# Patient Record
Sex: MALE | Race: WHITE | NOT HISPANIC OR LATINO | Employment: OTHER | URBAN - METROPOLITAN AREA
[De-identification: names, ages, dates, MRNs, and addresses within clinical notes are randomized per-mention and may not be internally consistent; named-entity substitution may affect disease eponyms.]

---

## 2017-02-17 ENCOUNTER — OFFICE VISIT (OUTPATIENT)
Dept: CARDIOLOGY | Facility: CLINIC | Age: 77
End: 2017-02-17

## 2017-02-17 VITALS
WEIGHT: 228 LBS | BODY MASS INDEX: 44.76 KG/M2 | HEART RATE: 61 BPM | HEIGHT: 60 IN | DIASTOLIC BLOOD PRESSURE: 70 MMHG | SYSTOLIC BLOOD PRESSURE: 130 MMHG

## 2017-02-17 DIAGNOSIS — E78.49 OTHER HYPERLIPIDEMIA: ICD-10-CM

## 2017-02-17 DIAGNOSIS — I25.810 CORONARY ARTERY DISEASE INVOLVING CORONARY BYPASS GRAFT OF NATIVE HEART WITHOUT ANGINA PECTORIS: Primary | ICD-10-CM

## 2017-02-17 DIAGNOSIS — I10 ESSENTIAL HYPERTENSION: ICD-10-CM

## 2017-02-17 DIAGNOSIS — I48.0 PAROXYSMAL ATRIAL FIBRILLATION (HCC): ICD-10-CM

## 2017-02-17 PROCEDURE — 99214 OFFICE O/P EST MOD 30 MIN: CPT | Performed by: INTERNAL MEDICINE

## 2017-02-17 PROCEDURE — 93000 ELECTROCARDIOGRAM COMPLETE: CPT | Performed by: INTERNAL MEDICINE

## 2017-02-17 NOTE — PROGRESS NOTES
Date of Office Visit: 17  Encounter Provider: Shan Rai MD  Place of Service: [facility] CARDIOLOGY  Patient Name: Robby Ken  :1940      Chief Complaint   Patient presents with   • Atrial Fibrillation   • Coronary Artery Disease     History of Present Illness  HPI Comments:  The patient is a very pleasant, 76-year-old gentleman with a history of coronary disease  with previous coronary artery bypass grafting.  He then presented with unstable angina and  acute infarct in 2013.  He went to [facility] and had cardiac  catheterization there that showed normal left ventricular systolic function.  The left  anterior descending was totally occluded but the left internal mammary graft to the left  anterior descending was patent.  The vein graft to the diagonal had severe disease.  The  circumflex was occluded.  The vein graft to the marginal however was patent.  The right  coronary artery was occluded, but the vein graft to the posterior descending artery was  patent.  His ischemia areas were just the distal circumflex and the diagonal vessel.  At  that time, it was felt that it was best to treat him medically, but he was also found to  be back in atrial fibrillation which was not treated.  He then went home from [facility] and presented back to [place], [place] with rapid atrial fibrillation and  unstable angina.  He did convert back to sinus rhythm and was transferred to [facility].  We reviewed his films and felt that it would still be better to treat that  medically but to treat his atrial fibrillation.  He was started on amiodarone.  But had recurrent atrial fibrillation so the amiodarone was discontinued.  He was also placed on warfarin.     He had some increased lower extremity edema and shortness of breath.  He was started on some Lasix and that improved.  He had an echocardiogram that showed normal left ventricular ejection  fraction only mild mitral insufficiency.   He comes in for follow-up.  He did see neurology about this balance issue no clear etiology was found.  He just [place] to move a little bit slower.  He was diagnosed with some cervical disc disease.  He still exercising regularly without any problems.  He was in the gym 3 days a week.  He's had no chest pain or pressure.  No real shortness breath, orthopnea, or PND.  No palpitations, near-syncope or syncope.  His amlodipine was discontinued.  Overall he's doing well.  His wife says things at home are good.    Atrial Fibrillation   Symptoms are negative for dizziness and weakness. Past medical history includes atrial fibrillation and CAD.   Coronary Artery Disease   Pertinent negatives include no dizziness, muscle weakness or weight gain. There is no history of past myocardial infarction.         Past Medical History   Diagnosis Date   • Atrial fibrillation    • Chronic coronary artery disease    • Hyperlipidemia    • Hypertension    • Myocardial infarction    • Prostate cancer    • Transient cerebral ischemia          Past Surgical History   Procedure Laterality Date   • Coronary artery bypass graft     • Cardiac catheterization  08/2013   • Prostatectomy       suprapubic   • Umbilical hernia repair           Current Outpatient Prescriptions on File Prior to Visit   Medication Sig Dispense Refill   • aspirin 81 MG tablet Take 1 tablet by mouth daily.     • b complex vitamins capsule Take 1 capsule by mouth daily.     • carvedilol (COREG) 6.25 MG tablet Take 6.25 mg by mouth. TAKE 2 TABLETS QAM, AND 1 TABLET QPM      • folic acid (FOLVITE) 400 MCG tablet Take 400 mcg by mouth Daily.     • furosemide (LASIX) 40 MG tablet Take 40 mg by mouth Daily As Needed.     • Glucosamine HCl (GLUCOSAMINE PO) Take 1 capsule by mouth daily.     • hydrALAZINE (APRESOLINE) 25 MG tablet Take 1 tablet by mouth 2 (two) times a day.     • Multiple Vitamin (MULTIVITAMIN) capsule Take 1 capsule by  mouth daily.     • omeprazole OTC (PriLOSEC OTC) 20 MG EC tablet Take 1 tablet by mouth daily.     • potassium chloride (KLOR-CON) 10 MEQ CR tablet Take 10 mEq by mouth Daily As Needed.     • pravastatin (PRAVACHOL) 40 MG tablet Take 1 tablet by mouth daily.     • WARFARIN SODIUM PO Take by mouth. TAKE 1 TABLET DAILY, IN ADDITION TO 1MG TABLETS EVERY OTHER DAY      • [DISCONTINUED] amLODIPine (NORVASC) 2.5 MG tablet Take 1 tablet by mouth daily.       No current facility-administered medications on file prior to visit.          Social History     Social History   • Marital status:      Spouse name: N/A   • Number of children: N/A   • Years of education: N/A     Occupational History   • Not on file.     Social History Main Topics   • Smoking status: Former Smoker   • Smokeless tobacco: Not on file   • Alcohol use No   • Drug use: No   • Sexual activity: Defer     Other Topics Concern   • Not on file     Social History Narrative       Family History   Problem Relation Age of Onset   • Breast cancer Sister    • Diabetes Sister    • Colon cancer Brother    • Prostate cancer Brother    • Coronary artery disease Other          Review of Systems   Constitution: Negative for decreased appetite, diaphoresis, fever, weakness, malaise/fatigue, weight gain and weight loss.   HENT: Negative for congestion, headaches, hearing loss, nosebleeds and tinnitus.    Eyes: Negative for blurred vision, double vision, vision loss in left eye, vision loss in right eye and visual disturbance.   Cardiovascular:        As noted in HPI   Respiratory:        As noted HPI   Endocrine: Negative for cold intolerance and heat intolerance.   Hematologic/Lymphatic: Negative for bleeding problem. Does not bruise/bleed easily.   Skin: Negative for color change, flushing, itching and rash.   Musculoskeletal: Negative for arthritis, back pain, joint pain, joint swelling, muscle weakness and myalgias.   Gastrointestinal: Negative for bloating,  "abdominal pain, constipation, diarrhea, dysphagia, heartburn, hematemesis, hematochezia, melena, nausea and vomiting.   Genitourinary: Negative for bladder incontinence, dysuria, frequency, nocturia and urgency.   Neurological: Positive for difficulty with concentration and numbness. Negative for dizziness, focal weakness, light-headedness, loss of balance, paresthesias and vertigo.   Psychiatric/Behavioral: Negative for depression, memory loss and substance abuse.       Procedures      ECG 12 Lead  Date/Time: 2/17/2017 12:35 PM  Performed by: NATHALIA WILDER  Authorized by: NATHALIA WILDER   Comparison: compared with previous ECG   Comparison to previous ECG: Back in afib                 Objective:      Visit Vitals   • /70 (BP Location: Right arm)   • Pulse 61   • Ht (!) 6\" (15.2 cm)   • Wt 228 lb (103 kg)   • BMI 4,452.82 kg/m2          Physical Exam  Physical Exam   Constitutional: He is oriented to person, place, and time. He appears well-developed and well-nourished. No distress.   HENT:   Head: Normocephalic.   Eyes: Conjunctivae are normal. Pupils are equal, round, and reactive to light. No scleral icterus.   Neck: Normal carotid pulses, no hepatojugular reflux and no JVD present. Carotid bruit is not present. No tracheal deviation, no edema and no erythema present. No thyromegaly present.   Cardiovascular: Normal rate, S1 normal, S2 normal, normal heart sounds and intact distal pulses.  An irregularly irregular rhythm present.  No extrasystoles are present. PMI is not displaced.  Exam reveals no gallop, no distant heart sounds and no friction rub.    No murmur heard.  Pulses:       Carotid pulses are 2+ on the right side, and 2+ on the left side.       Radial pulses are 2+ on the right side, and 2+ on the left side.        Femoral pulses are 2+ on the right side, and 2+ on the left side.       Dorsalis pedis pulses are 2+ on the right side, and 2+ on the left side.        Posterior tibial pulses " are 2+ on the right side, and 2+ on the left side.   Pulmonary/Chest: Effort normal and breath sounds normal. No respiratory distress. He has no decreased breath sounds. He has no wheezes. He has no rhonchi. He has no rales. He exhibits no tenderness.   Abdominal: Soft. Bowel sounds are normal. He exhibits no distension and no mass. There is no hepatosplenomegaly. There is no tenderness. There is no rebound and no guarding.   Musculoskeletal: He exhibits no edema, tenderness or deformity.   Neurological: He is alert and oriented to person, place, and time.   Skin: Skin is warm and dry. No rash noted. He is not diaphoretic. No cyanosis or erythema. No pallor. Nails show no clubbing.   Psychiatric: He has a normal mood and affect. His speech is normal and behavior is normal. Judgment and thought content normal.           Assessment:   1. This is a 76-year-old gentleman with a history of severe coronary disease status post coronary artery bypass grafting. Catheterization in 08/2013 showed stenosis in the vein  graft to the diagonal with the ischemic areas just being the diagonal vessel and the very distal circumflex.  Echocardiogram August 2016 normal left ventricular ejection fraction  Coronary Artery Disease  Assessment  • The patient has no angina    Plan  • Lifestyle modifications discussed include adhering to a heart healthy diet, avoidance of tobacco products, maintenance of a healthy weight, medication compliance, regular exercise and regular monitoring of cholesterol and blood pressure    Subjective - Objective  • There is a history of previous coronary artery bypass graft  • Current antiplatelet therapy includes aspirin 81 mg    He is now stable on medical therapy. He will continue the same and see Us in follow up in one year.     2. Paroxysmal atrial fibrillation.  Atrial Fibrillation and Atrial Flutter  Assessment  • The patient has paroxysmal atrial fibrillation  • This is non-valvular in etiology  • The  patient's CHADS2-VASc score is 4  • A [id number] score of 2 or more is considered a high risk for a thromboembolic event  • Warfarin prescribed    Plan  • Attempt to maintain sinus rhythm  • Continue warfarin for antithrombotic therapy, bleeding issues discussed  • Continue beta blocker for rhythm control  Still in and out of atrial fibrillation but asymptomatic continue the same.     3. Hyperlipidemia on pravastatin now.   4. History of hypertension. His blood pressure appears to be adequately controlled.   5. History of remote transient ischemic attack as above.   6. Gait disturbance .  Relatively benign neurologic evaluation.         Plan:

## 2018-02-20 ENCOUNTER — OFFICE VISIT (OUTPATIENT)
Dept: CARDIOLOGY | Facility: CLINIC | Age: 78
End: 2018-02-20

## 2018-02-20 VITALS
SYSTOLIC BLOOD PRESSURE: 150 MMHG | WEIGHT: 230 LBS | HEIGHT: 72 IN | DIASTOLIC BLOOD PRESSURE: 80 MMHG | HEART RATE: 87 BPM | BODY MASS INDEX: 31.15 KG/M2

## 2018-02-20 DIAGNOSIS — I10 ESSENTIAL HYPERTENSION: ICD-10-CM

## 2018-02-20 DIAGNOSIS — I48.20 CHRONIC ATRIAL FIBRILLATION (HCC): ICD-10-CM

## 2018-02-20 DIAGNOSIS — E78.2 MIXED HYPERLIPIDEMIA: ICD-10-CM

## 2018-02-20 DIAGNOSIS — I25.810 CORONARY ARTERY DISEASE INVOLVING CORONARY BYPASS GRAFT OF NATIVE HEART WITHOUT ANGINA PECTORIS: Primary | ICD-10-CM

## 2018-02-20 PROCEDURE — 99214 OFFICE O/P EST MOD 30 MIN: CPT | Performed by: INTERNAL MEDICINE

## 2018-02-20 PROCEDURE — 93000 ELECTROCARDIOGRAM COMPLETE: CPT | Performed by: INTERNAL MEDICINE

## 2018-02-20 NOTE — PROGRESS NOTES
Date of Office Visit: 18  Encounter Provider: Shan Rai MD  Place of Service: [facility] CARDIOLOGY  Patient Name: Robby Ken  :1940      Chief Complaint   Patient presents with   • Coronary Artery Disease   • Atrial Fibrillation     History of Present Illness  HPI Comments:  The patient is a very pleasant, 77-year-old gentleman with a history of coronary disease  with previous coronary artery bypass grafting.  He then presented with unstable angina and  acute infarct in 2013.  He went to [facility] and had cardiac  catheterization there that showed normal left ventricular systolic function.  The left  anterior descending was totally occluded but the left internal mammary graft to the left  anterior descending was patent.  The vein graft to the diagonal had severe disease.  The  circumflex was occluded.  The vein graft to the marginal however was patent.  The right  coronary artery was occluded, but the vein graft to the posterior descending artery was  patent.  His ischemia areas were just the distal circumflex and the diagonal vessel.  At  that time, it was felt that it was best to treat him medically, but he was also found to  be back in atrial fibrillation which was not treated.  He then went home from [facility] and presented back to [place], [place] with rapid atrial fibrillation and  unstable angina.  He did convert back to sinus rhythm and was transferred to [facility].  We reviewed his films and felt that it would still be better to treat that  medically but to treat his atrial fibrillation.  He was started on amiodarone.  But had recurrent atrial fibrillation so the amiodarone was discontinued.  He was also placed on warfarin.     He had some increased lower extremity edema and shortness of breath.  He was started on some Lasix and that improved.  He had an echocardiogram that showed normal left ventricular ejection  fraction only mild mitral insufficiency.   He comes in for follow-up.  The patient denies chest pain, pressure and heaviness. No shortness of breath, othopnea or PND. No palpitations, near syncope or syncope. No stroke type symptoms like paralysis, paresthesia, abrupt vision loss and dysarthria. No bleeding like blood in the stool or dark stools.   During I am he did fall and broke his right wrist and hasn't in a cast.  Prior to that he been exercising regularly he is getting get back to that probably starting tomorrow he has a couple more weeks to wear the cast.  Overall he's doing well things at home are good.    Coronary Artery Disease   Pertinent negatives include no dizziness, muscle weakness or weight gain. There is no history of past myocardial infarction.   Atrial Fibrillation   Symptoms are negative for dizziness and weakness. Past medical history includes atrial fibrillation and CAD.         Past Medical History:   Diagnosis Date   • Atrial fibrillation    • Chronic coronary artery disease    • Hyperlipidemia    • Hypertension    • Myocardial infarction    • Prostate cancer    • Transient cerebral ischemia          Past Surgical History:   Procedure Laterality Date   • CARDIAC CATHETERIZATION  08/2013   • CORONARY ARTERY BYPASS GRAFT     • PROSTATECTOMY      suprapubic   • UMBILICAL HERNIA REPAIR           Current Outpatient Prescriptions on File Prior to Visit   Medication Sig Dispense Refill   • aspirin 81 MG tablet Take 1 tablet by mouth daily.     • b complex vitamins capsule Take 1 capsule by mouth daily.     • carvedilol (COREG) 6.25 MG tablet Take 6.25 mg by mouth. TAKE 2 TABLETS QAM, AND 1 TABLET QPM      • folic acid (FOLVITE) 400 MCG tablet Take 400 mcg by mouth Daily.     • furosemide (LASIX) 40 MG tablet Take 40 mg by mouth Daily As Needed.     • Glucosamine HCl (GLUCOSAMINE PO) Take 1 capsule by mouth daily.     • hydrALAZINE (APRESOLINE) 25 MG tablet Take 1 tablet by mouth 2 (two) times a  day.     • Multiple Vitamin (MULTIVITAMIN) capsule Take 1 capsule by mouth daily.     • omeprazole OTC (PriLOSEC OTC) 20 MG EC tablet Take 1 tablet by mouth daily.     • potassium chloride (KLOR-CON) 10 MEQ CR tablet Take 10 mEq by mouth Daily As Needed.     • pravastatin (PRAVACHOL) 40 MG tablet Take 1 tablet by mouth daily.     • WARFARIN SODIUM PO Take by mouth. TAKE 1 TABLET DAILY, IN ADDITION TO 1MG TABLETS EVERY OTHER DAY        No current facility-administered medications on file prior to visit.          Social History     Social History   • Marital status:      Spouse name: N/A   • Number of children: N/A   • Years of education: N/A     Occupational History   • Not on file.     Social History Main Topics   • Smoking status: Former Smoker   • Smokeless tobacco: Not on file   • Alcohol use No   • Drug use: No   • Sexual activity: Defer     Other Topics Concern   • Not on file     Social History Narrative       Family History   Problem Relation Age of Onset   • Breast cancer Sister    • Diabetes Sister    • Colon cancer Brother    • Prostate cancer Brother    • Coronary artery disease Other          Review of Systems   Constitution: Negative for decreased appetite, diaphoresis, fever, weakness, malaise/fatigue, weight gain and weight loss.   HENT: Negative for congestion, hearing loss, nosebleeds and tinnitus.    Eyes: Negative for blurred vision, double vision, vision loss in left eye, vision loss in right eye and visual disturbance.   Cardiovascular:        As noted in HPI   Respiratory:        As noted HPI   Endocrine: Negative for cold intolerance and heat intolerance.   Hematologic/Lymphatic: Negative for bleeding problem. Does not bruise/bleed easily.   Skin: Negative for color change, flushing, itching and rash.   Musculoskeletal: Negative for arthritis, back pain, joint pain, joint swelling, muscle weakness and myalgias.   Gastrointestinal: Negative for bloating, abdominal pain, constipation,  "diarrhea, dysphagia, heartburn, hematemesis, hematochezia, melena, nausea and vomiting.   Genitourinary: Negative for bladder incontinence, dysuria, frequency, nocturia and urgency.   Neurological: Negative for dizziness, focal weakness, headaches, light-headedness, loss of balance, numbness, paresthesias and vertigo.   Psychiatric/Behavioral: Negative for depression, memory loss and substance abuse.       Procedures      ECG 12 Lead  Date/Time: 2/20/2018 1:30 PM  Performed by: NATHALIA WILDER  Authorized by: NATHALIA WILDER   Comparison: compared with previous ECG   Similar to previous ECG  Rhythm: atrial fibrillation  Rate: normal  QRS axis: normal                 Objective:    /80 (BP Location: Left arm)  Pulse 87  Ht 182.9 cm (72\")  Wt 104 kg (230 lb)  BMI 31.19 kg/m2       Physical Exam  Physical Exam   Constitutional: He is oriented to person, place, and time. He appears well-developed and well-nourished. No distress.   HENT:   Head: Normocephalic.   Eyes: Conjunctivae are normal. Pupils are equal, round, and reactive to light. No scleral icterus.   Neck: Normal carotid pulses, no hepatojugular reflux and no JVD present. Carotid bruit is not present. No tracheal deviation, no edema and no erythema present. No thyromegaly present.   Cardiovascular: Normal rate, S1 normal, S2 normal, normal heart sounds and intact distal pulses.  An irregularly irregular rhythm present.  No extrasystoles are present. PMI is not displaced.  Exam reveals no gallop, no distant heart sounds and no friction rub.    No murmur heard.  Pulses:       Carotid pulses are 2+ on the right side, and 2+ on the left side.       Radial pulses are 2+ on the right side, and 2+ on the left side.        Femoral pulses are 2+ on the right side, and 2+ on the left side.       Dorsalis pedis pulses are 2+ on the right side, and 2+ on the left side.        Posterior tibial pulses are 2+ on the right side, and 2+ on the left side. "   Pulmonary/Chest: Effort normal and breath sounds normal. No respiratory distress. He has no decreased breath sounds. He has no wheezes. He has no rhonchi. He has no rales. He exhibits no tenderness.   Abdominal: Soft. Bowel sounds are normal. He exhibits no distension and no mass. There is no hepatosplenomegaly. There is no tenderness. There is no rebound and no guarding.   Musculoskeletal: He exhibits no edema, tenderness or deformity.   Right forearm cast   Neurological: He is alert and oriented to person, place, and time.   Skin: Skin is warm and dry. No rash noted. He is not diaphoretic. No cyanosis or erythema. No pallor. Nails show no clubbing.   Psychiatric: He has a normal mood and affect. His speech is normal and behavior is normal. Judgment and thought content normal.           Assessment:   1. This is a 77-year-old gentleman with a history of severe coronary disease status post coronary artery bypass grafting. Catheterization in 08/2013 showed stenosis in the vein  graft to the diagonal with the ischemic areas just being the diagonal vessel and the very distal circumflex.  Echocardiogram August 2016 normal left ventricular ejection fraction  Coronary Artery Disease  Assessment  • The patient has no angina    Plan  • Lifestyle modifications discussed include adhering to a heart healthy diet, avoidance of tobacco products, maintenance of a healthy weight, medication compliance, regular exercise and regular monitoring of cholesterol and blood pressure    Subjective - Objective  • There is a history of previous coronary artery bypass graft  • Current antiplatelet therapy includes aspirin 81 mg    He is now stable on medical therapy. He will continue the same and see Us in follow up in one year.      2. Atrial fibrillation.  Atrial Fibrillation and Atrial Flutter  Assessment  • The patient has permanent atrial fibrillation  • This is non-valvular in etiology  • The patient's CHADS2-VASc score is 4  • A  [id number] score of 2 or more is considered a high risk for a thromboembolic event  • Warfarin prescribed    Plan  • Attempt to maintain sinus rhythm  • Continue warfarin for antithrombotic therapy, bleeding issues discussed  • Continue beta blocker for rhythm control  Asymptomatic continue the same.     3. Hyperlipidemia on pravastatin now.  If his LDL is not at goal of 70 or less consider switching to atorvastatin. Received his blood work his last LDL was 90 in April 2017. Spoke with wife we will switch to atorvastatin 40 mg a day and they will call if problems.  4. History of hypertension. His blood pressure appears to be adequately controlled.   5. History of remote transient ischemic attack as above.    6.  Recent fall on the ice with fractured right wrist in a cast but recovering.         Plan:

## 2018-02-21 RX ORDER — ATORVASTATIN CALCIUM 40 MG/1
40 TABLET, FILM COATED ORAL DAILY
Qty: 90 TABLET | Refills: 3 | Status: SHIPPED | OUTPATIENT
Start: 2018-02-21 | End: 2019-01-15 | Stop reason: SDUPTHER

## 2019-01-16 RX ORDER — ATORVASTATIN CALCIUM 40 MG/1
TABLET, FILM COATED ORAL
Qty: 90 TABLET | Refills: 1 | Status: SHIPPED | OUTPATIENT
Start: 2019-01-16 | End: 2019-08-26 | Stop reason: SDUPTHER

## 2019-06-03 ENCOUNTER — HOSPITAL ENCOUNTER (OUTPATIENT)
Dept: CARDIOLOGY | Facility: HOSPITAL | Age: 79
Discharge: HOME | End: 2019-06-03
Admitting: INTERNAL MEDICINE

## 2019-06-03 ENCOUNTER — OFFICE VISIT (OUTPATIENT)
Dept: CARDIOLOGY | Facility: CLINIC | Age: 79
End: 2019-06-03

## 2019-06-03 VITALS
HEIGHT: 72 IN | DIASTOLIC BLOOD PRESSURE: 80 MMHG | BODY MASS INDEX: 33.18 KG/M2 | WEIGHT: 245 LBS | SYSTOLIC BLOOD PRESSURE: 124 MMHG

## 2019-06-03 DIAGNOSIS — I48.20 CHRONIC ATRIAL FIBRILLATION (HCC): ICD-10-CM

## 2019-06-03 DIAGNOSIS — I10 ESSENTIAL HYPERTENSION: ICD-10-CM

## 2019-06-03 DIAGNOSIS — I25.810 CORONARY ARTERY DISEASE INVOLVING CORONARY BYPASS GRAFT OF NATIVE HEART WITHOUT ANGINA PECTORIS: Primary | ICD-10-CM

## 2019-06-03 DIAGNOSIS — E78.2 MIXED HYPERLIPIDEMIA: ICD-10-CM

## 2019-06-03 DIAGNOSIS — R60.0 LOWER EXTREMITY EDEMA: ICD-10-CM

## 2019-06-03 LAB
AORTIC ROOT ANNULUS: 2.4 CM
ASCENDING AORTA: 3.1 CM
BH CV ECHO MEAS - ACS: 2.1 CM
BH CV ECHO MEAS - AO MAX PG (FULL): 3.8 MMHG
BH CV ECHO MEAS - AO MAX PG: 5.2 MMHG
BH CV ECHO MEAS - AO MEAN PG (FULL): 1.5 MMHG
BH CV ECHO MEAS - AO MEAN PG: 2.3 MMHG
BH CV ECHO MEAS - AO V2 MAX: 114 CM/SEC
BH CV ECHO MEAS - AO V2 MEAN: 70.2 CM/SEC
BH CV ECHO MEAS - AO V2 VTI: 19.3 CM
BH CV ECHO MEAS - AVA(I,A): 1.8 CM^2
BH CV ECHO MEAS - AVA(I,D): 1.8 CM^2
BH CV ECHO MEAS - AVA(V,A): 1.7 CM^2
BH CV ECHO MEAS - AVA(V,D): 1.7 CM^2
BH CV ECHO MEAS - BSA(HAYCOCK): 2.4 M^2
BH CV ECHO MEAS - BSA: 2.3 M^2
BH CV ECHO MEAS - BZI_BMI: 33.2 KILOGRAMS/M^2
BH CV ECHO MEAS - BZI_METRIC_HEIGHT: 182.9 CM
BH CV ECHO MEAS - BZI_METRIC_WEIGHT: 111.1 KG
BH CV ECHO MEAS - EDV(MOD-SP2): 72 ML
BH CV ECHO MEAS - EDV(MOD-SP4): 71 ML
BH CV ECHO MEAS - EDV(TEICH): 91 ML
BH CV ECHO MEAS - EF(CUBED): 59.6 %
BH CV ECHO MEAS - EF(MOD-BP): 53 %
BH CV ECHO MEAS - EF(MOD-SP2): 54.2 %
BH CV ECHO MEAS - EF(MOD-SP4): 49.3 %
BH CV ECHO MEAS - EF(TEICH): 51.4 %
BH CV ECHO MEAS - ESV(MOD-SP2): 33 ML
BH CV ECHO MEAS - ESV(MOD-SP4): 36 ML
BH CV ECHO MEAS - ESV(TEICH): 44.2 ML
BH CV ECHO MEAS - FS: 26.1 %
BH CV ECHO MEAS - IVS/LVPW: 1
BH CV ECHO MEAS - IVSD: 1.3 CM
BH CV ECHO MEAS - LAT PEAK E' VEL: 14 CM/SEC
BH CV ECHO MEAS - LV DIASTOLIC VOL/BSA (35-75): 30.6 ML/M^2
BH CV ECHO MEAS - LV MASS(C)D: 211.2 GRAMS
BH CV ECHO MEAS - LV MASS(C)DI: 90.9 GRAMS/M^2
BH CV ECHO MEAS - LV MAX PG: 1.4 MMHG
BH CV ECHO MEAS - LV MEAN PG: 0.79 MMHG
BH CV ECHO MEAS - LV SYSTOLIC VOL/BSA (12-30): 15.5 ML/M^2
BH CV ECHO MEAS - LV V1 MAX: 60.1 CM/SEC
BH CV ECHO MEAS - LV V1 MEAN: 40.5 CM/SEC
BH CV ECHO MEAS - LV V1 VTI: 10.4 CM
BH CV ECHO MEAS - LVIDD: 4.5 CM
BH CV ECHO MEAS - LVIDS: 3.3 CM
BH CV ECHO MEAS - LVLD AP2: 7.3 CM
BH CV ECHO MEAS - LVLD AP4: 7.1 CM
BH CV ECHO MEAS - LVLS AP2: 5.8 CM
BH CV ECHO MEAS - LVLS AP4: 6.2 CM
BH CV ECHO MEAS - LVOT AREA (M): 3.1 CM^2
BH CV ECHO MEAS - LVOT AREA: 3.3 CM^2
BH CV ECHO MEAS - LVOT DIAM: 2 CM
BH CV ECHO MEAS - LVPWD: 1.3 CM
BH CV ECHO MEAS - MED PEAK E' VEL: 9 CM/SEC
BH CV ECHO MEAS - MR MAX PG: 21 MMHG
BH CV ECHO MEAS - MR MAX VEL: 229.4 CM/SEC
BH CV ECHO MEAS - MV DEC SLOPE: 398 CM/SEC^2
BH CV ECHO MEAS - MV DEC TIME: 0.16 SEC
BH CV ECHO MEAS - MV E MAX VEL: 75.2 CM/SEC
BH CV ECHO MEAS - MV MAX PG: 3 MMHG
BH CV ECHO MEAS - MV MEAN PG: 1 MMHG
BH CV ECHO MEAS - MV P1/2T MAX VEL: 75.7 CM/SEC
BH CV ECHO MEAS - MV P1/2T: 55.7 MSEC
BH CV ECHO MEAS - MV V2 MAX: 86.9 CM/SEC
BH CV ECHO MEAS - MV V2 MEAN: 47.5 CM/SEC
BH CV ECHO MEAS - MV V2 VTI: 21.1 CM
BH CV ECHO MEAS - MVA P1/2T LCG: 2.9 CM^2
BH CV ECHO MEAS - MVA(P1/2T): 4 CM^2
BH CV ECHO MEAS - MVA(VTI): 1.6 CM^2
BH CV ECHO MEAS - PA ACC TIME: 0.1 SEC
BH CV ECHO MEAS - PA MAX PG (FULL): 3.6 MMHG
BH CV ECHO MEAS - PA MAX PG: 4.7 MMHG
BH CV ECHO MEAS - PA PR(ACCEL): 36.2 MMHG
BH CV ECHO MEAS - PA V2 MAX: 108.6 CM/SEC
BH CV ECHO MEAS - PVA(V,A): 1.6 CM^2
BH CV ECHO MEAS - PVA(V,D): 1.6 CM^2
BH CV ECHO MEAS - QP/QS: 0.77
BH CV ECHO MEAS - RV MAX PG: 1.1 MMHG
BH CV ECHO MEAS - RV MEAN PG: 0.61 MMHG
BH CV ECHO MEAS - RV V1 MAX: 52.9 CM/SEC
BH CV ECHO MEAS - RV V1 MEAN: 36.6 CM/SEC
BH CV ECHO MEAS - RV V1 VTI: 8 CM
BH CV ECHO MEAS - RVOT AREA: 3.3 CM^2
BH CV ECHO MEAS - RVOT DIAM: 2 CM
BH CV ECHO MEAS - SI(CUBED): 22.9 ML/M^2
BH CV ECHO MEAS - SI(LVOT): 14.6 ML/M^2
BH CV ECHO MEAS - SI(MOD-SP2): 16.8 ML/M^2
BH CV ECHO MEAS - SI(MOD-SP4): 15.1 ML/M^2
BH CV ECHO MEAS - SI(TEICH): 20.1 ML/M^2
BH CV ECHO MEAS - SV(CUBED): 53.2 ML
BH CV ECHO MEAS - SV(LVOT): 33.9 ML
BH CV ECHO MEAS - SV(MOD-SP2): 39 ML
BH CV ECHO MEAS - SV(MOD-SP4): 35 ML
BH CV ECHO MEAS - SV(RVOT): 26 ML
BH CV ECHO MEAS - SV(TEICH): 46.7 ML
BH CV ECHO MEAS - TAPSE (>1.6): 1.9 CM2
BH CV ECHO MEAS - TR MAX VEL: 224 CM/SEC
BH CV ECHO MEASUREMENTS AVERAGE E/E' RATIO: 6.54
BH CV XLRA - RV BASE: 2.6 CM
BH CV XLRA - TDI S': 11 CM/SEC
LEFT ATRIUM VOLUME INDEX: 30 ML/M2
MAXIMAL PREDICTED HEART RATE: 143 BPM
SINUS: 3.2 CM
STJ: 2.7 CM
STRESS TARGET HR: 122 BPM

## 2019-06-03 PROCEDURE — 93306 TTE W/DOPPLER COMPLETE: CPT | Performed by: INTERNAL MEDICINE

## 2019-06-03 PROCEDURE — 93000 ELECTROCARDIOGRAM COMPLETE: CPT | Performed by: INTERNAL MEDICINE

## 2019-06-03 PROCEDURE — 93306 TTE W/DOPPLER COMPLETE: CPT

## 2019-06-03 PROCEDURE — 25010000002 PERFLUTREN (DEFINITY) 8.476 MG IN SODIUM CHLORIDE 0.9 % 10 ML INJECTION: Performed by: INTERNAL MEDICINE

## 2019-06-03 PROCEDURE — 99214 OFFICE O/P EST MOD 30 MIN: CPT | Performed by: INTERNAL MEDICINE

## 2019-06-03 RX ORDER — WARFARIN SODIUM 5 MG/1
5 TABLET ORAL
COMMUNITY

## 2019-06-03 RX ADMIN — PERFLUTREN 1.5 ML: 6.52 INJECTION, SUSPENSION INTRAVENOUS at 13:49

## 2019-06-03 NOTE — PROGRESS NOTES
Date of Office Visit: 19  Encounter Provider: Shan Rai MD  Place of Service: [facility]  Patient Name: Robby Ken  :1940  Referral Provider:Shan Rai MD      No chief complaint on file.    History of Present Illness   The patient is a very pleasant, 78-year-old gentleman with a history of coronary disease  with previous coronary artery bypass grafting.  He then presented with unstable angina and  acute infarct in 2013.  He went to [facility] and had cardiac  catheterization there that showed normal left ventricular systolic function.  The left  anterior descending was totally occluded but the left internal mammary graft to the left  anterior descending was patent.  The vein graft to the diagonal had severe disease.  The  circumflex was occluded.  The vein graft to the marginal however was patent.  The right  coronary artery was occluded, but the vein graft to the posterior descending artery was  patent.  His ischemia areas were just the distal circumflex and the diagonal vessel.  At  that time, it was felt that it was best to treat him medically, but he was also found to  be back in atrial fibrillation which was not treated.  He then went home from [facility] and presented back to [place], [place] with rapid atrial fibrillation and  unstable angina.  He did convert back to sinus rhythm and was transferred to [facility].  We reviewed his films and felt that it would still be better to treat that  medically but to treat his atrial fibrillation.  He was started on amiodarone.  But had recurrent atrial fibrillation so the amiodarone was discontinued.  He was also placed on warfarin.     He had some increased lower extremity edema and shortness of breath.  He was started on some Lasix and that improved.  He had an echocardiogram that showed normal left ventricular ejection fraction only mild mitral insufficiency.    He comes in for follow-up.  Coming in now about March 2019 he started having some shortness of breath increased lower extremity edema but he is only taking his Lasix as needed he was seen in [place] start taking the diuretic daily improved and he went back to just as needed and he got progressively more short of breath and increased lower extremity edema so PCP again this time restart the Lasix and increase at 80 mg a day and he is getting some better.  Gained weight.  He had also been falling and did fall and hurt his back.  He said no chest pain or pressure.  No palpitations, near syncope or syncope. No stroke type symptoms like paralysis, paresthesia, abrupt vision loss and dysarthria. No bleeding like blood in the stool or dark stools.       Coronary Artery Disease   Symptoms include weight gain. Pertinent negatives include no dizziness or muscle weakness. There is no history of past myocardial infarction.   Atrial Fibrillation   Symptoms are negative for dizziness and weakness. Past medical history includes atrial fibrillation and CAD.         Past Medical History:   Diagnosis Date   • Atrial fibrillation (CMS/HCC)    • Chronic coronary artery disease    • Hyperlipidemia    • Hypertension    • Myocardial infarction (CMS/HCC)    • Prostate cancer (CMS/HCC)    • Transient cerebral ischemia          Past Surgical History:   Procedure Laterality Date   • CARDIAC CATHETERIZATION  08/2013   • CORONARY ARTERY BYPASS GRAFT     • PROSTATECTOMY      suprapubic   • UMBILICAL HERNIA REPAIR           Current Outpatient Medications on File Prior to Visit   Medication Sig Dispense Refill   • aspirin 81 MG tablet Take 1 tablet by mouth daily.     • atorvastatin (LIPITOR) 40 MG tablet TAKE 1 TABLET EVERY DAY 90 tablet 1   • b complex vitamins capsule Take 1 capsule by mouth daily.     • carvedilol (COREG) 6.25 MG tablet Take 6.25 mg by mouth. TAKE 2 TABLETS QAM, AND 1 TABLET QPM      • folic acid (FOLVITE) 400 MCG tablet Take  400 mcg by mouth Daily.     • furosemide (LASIX) 40 MG tablet Take 40 mg by mouth Daily As Needed.     • Glucosamine HCl (GLUCOSAMINE PO) Take 1 capsule by mouth daily.     • hydrALAZINE (APRESOLINE) 25 MG tablet Take 1 tablet by mouth 2 (two) times a day.     • Multiple Vitamin (MULTIVITAMIN) capsule Take 1 capsule by mouth daily.     • omeprazole OTC (PriLOSEC OTC) 20 MG EC tablet Take 1 tablet by mouth daily.     • potassium chloride (KLOR-CON) 10 MEQ CR tablet Take 10 mEq by mouth Daily As Needed.     • WARFARIN SODIUM PO Take by mouth. TAKE 1 TABLET DAILY, IN ADDITION TO 1MG TABLETS EVERY OTHER DAY        No current facility-administered medications on file prior to visit.          Social History     Socioeconomic History   • Marital status:      Spouse name: Not on file   • Number of children: Not on file   • Years of education: Not on file   • Highest education level: Not on file   Tobacco Use   • Smoking status: Former Smoker   Substance and Sexual Activity   • Alcohol use: No   • Drug use: No   • Sexual activity: Defer       Family History   Problem Relation Age of Onset   • Breast cancer Sister    • Diabetes Sister    • Colon cancer Brother    • Prostate cancer Brother    • Coronary artery disease Other          Review of Systems   Constitution: Positive for malaise/fatigue and weight gain. Negative for decreased appetite, diaphoresis, fever, weakness and weight loss.   HENT: Negative for congestion, hearing loss, nosebleeds and tinnitus.    Eyes: Negative for blurred vision, double vision, vision loss in left eye, vision loss in right eye and visual disturbance.   Cardiovascular:        As noted in HPI   Respiratory:        As noted HPI   Endocrine: Negative for cold intolerance and heat intolerance.   Hematologic/Lymphatic: Negative for bleeding problem. Does not bruise/bleed easily.   Skin: Negative for color change, flushing, itching and rash.   Musculoskeletal: Negative for arthritis, back  pain, joint pain, joint swelling, muscle weakness and myalgias.   Gastrointestinal: Negative for bloating, abdominal pain, constipation, diarrhea, dysphagia, heartburn, hematemesis, hematochezia, melena, nausea and vomiting.   Genitourinary: Negative for bladder incontinence, dysuria, frequency, nocturia and urgency.   Neurological: Negative for dizziness, focal weakness, headaches, light-headedness, loss of balance, numbness, paresthesias and vertigo.   Psychiatric/Behavioral: Negative for depression, memory loss and substance abuse.       Procedures      ECG 12 Lead  Date/Time: 6/3/2019 1:00 PM  Performed by: Shan Rai MD  Authorized by: Shan Rai MD   Comparison: compared with previous ECG   Similar to previous ECG  Rhythm: atrial fibrillation  Rate: normal  QRS axis: normal                  Objective:    There were no vitals taken for this visit.       Physical Exam  Physical Exam   Constitutional: He is oriented to person, place, and time. He appears well-developed and well-nourished. No distress.   HENT:   Head: Normocephalic.   Eyes: Conjunctivae are normal. Pupils are equal, round, and reactive to light. No scleral icterus.   Neck: Normal carotid pulses, no hepatojugular reflux and no JVD present. Carotid bruit is not present. No tracheal deviation, no edema and no erythema present. No thyromegaly present.   Cardiovascular: Normal rate, S1 normal, S2 normal, normal heart sounds and intact distal pulses. An irregularly irregular rhythm present.  No extrasystoles are present. PMI is not displaced. Exam reveals no gallop, no distant heart sounds and no friction rub.   No murmur heard.  Pulses:       Carotid pulses are 2+ on the right side, and 2+ on the left side.       Radial pulses are 2+ on the right side, and 2+ on the left side.        Femoral pulses are 2+ on the right side, and 2+ on the left side.       Dorsalis pedis pulses are 2+ on the right side, and 2+ on the left side.         Posterior tibial pulses are 2+ on the right side, and 2+ on the left side.   Pulmonary/Chest: Effort normal and breath sounds normal. No respiratory distress. He has no decreased breath sounds. He has no wheezes. He has no rhonchi. He has no rales. He exhibits no tenderness.   Abdominal: Soft. Bowel sounds are normal. He exhibits no distension and no mass. There is no hepatosplenomegaly. There is no tenderness. There is no rebound and no guarding.   Musculoskeletal: He exhibits edema (1+ right tibial). He exhibits no tenderness or deformity.   Neurological: He is alert and oriented to person, place, and time.   Skin: Skin is warm and dry. No rash noted. He is not diaphoretic. No cyanosis or erythema. No pallor. Nails show no clubbing.   Psychiatric: He has a normal mood and affect. His speech is normal and behavior is normal. Judgment and thought content normal.           Assessment:   1. This is a 78-year-old gentleman with a history of severe coronary disease status post coronary artery bypass grafting. Catheterization in 08/2013 showed stenosis in the vein  graft to the diagonal with the ischemic areas just being the diagonal vessel and the very distal circumflex.  Echocardiogram August 2016 normal left ventricular ejection fraction  Coronary Artery Disease  Assessment  • The patient has no angina    Plan  • Lifestyle modifications discussed include adhering to a heart healthy diet, avoidance of tobacco products, maintenance of a healthy weight, medication compliance, regular exercise and regular monitoring of cholesterol and blood pressure    Subjective - Objective  • There is a history of previous coronary artery bypass graft  • Current antiplatelet therapy includes aspirin 81 mg          2. Atrial fibrillation.  Atrial Fibrillation and Atrial Flutter  Assessment  • The patient has permanent atrial fibrillation  • This is non-valvular in etiology  • The patient's CHADS2-VASc score is 4  • A [id number] score  of 2 or more is considered a high risk for a thromboembolic event  • Warfarin prescribed    Plan  • Attempt to maintain sinus rhythm  • Continue warfarin for antithrombotic therapy, bleeding issues discussed  • Continue beta blocker for rhythm control       3. Hyperlipidemia on atorvastatin target dose.  4. History of hypertension. His blood pressure appears to be adequately controlled.   5. History of remote transient ischemic attack as above.    6.  Lower extremity edema and shortness of breath.  Certainly could have some underlying heart failure.  He is to have a echocardiogram today if his LV systolic function is normal and no significant valvular disease would have him just take the Lasix 40 mg a day and keep his regular appointment with us.  If his LV function is obviously worse we will need to work-up his cardiac status further.  7.  Fall and back injury.  Is has limited some.         Plan:

## 2019-08-27 RX ORDER — ATORVASTATIN CALCIUM 40 MG/1
TABLET, FILM COATED ORAL
Qty: 90 TABLET | Refills: 0 | Status: SHIPPED | OUTPATIENT
Start: 2019-08-27 | End: 2019-11-25 | Stop reason: SDUPTHER

## 2019-11-26 RX ORDER — ATORVASTATIN CALCIUM 40 MG/1
TABLET, FILM COATED ORAL
Qty: 90 TABLET | Refills: 0 | Status: SHIPPED | OUTPATIENT
Start: 2019-11-26 | End: 2020-03-05

## 2020-03-05 RX ORDER — ATORVASTATIN CALCIUM 40 MG/1
TABLET, FILM COATED ORAL
Qty: 90 TABLET | Refills: 0 | Status: SHIPPED | OUTPATIENT
Start: 2020-03-05 | End: 2020-06-24 | Stop reason: SDUPTHER

## 2020-06-24 ENCOUNTER — OFFICE VISIT (OUTPATIENT)
Dept: CARDIOLOGY | Facility: CLINIC | Age: 80
End: 2020-06-24

## 2020-06-24 ENCOUNTER — TELEPHONE (OUTPATIENT)
Dept: CARDIOLOGY | Facility: CLINIC | Age: 80
End: 2020-06-24

## 2020-06-24 VITALS
WEIGHT: 269 LBS | DIASTOLIC BLOOD PRESSURE: 62 MMHG | HEART RATE: 102 BPM | BODY MASS INDEX: 36.44 KG/M2 | HEIGHT: 72 IN | SYSTOLIC BLOOD PRESSURE: 100 MMHG

## 2020-06-24 DIAGNOSIS — E66.9 OBESITY (BMI 35.0-39.9 WITHOUT COMORBIDITY): ICD-10-CM

## 2020-06-24 DIAGNOSIS — E78.2 MIXED HYPERLIPIDEMIA: ICD-10-CM

## 2020-06-24 DIAGNOSIS — R60.0 LOWER EXTREMITY EDEMA: ICD-10-CM

## 2020-06-24 DIAGNOSIS — I10 ESSENTIAL HYPERTENSION: ICD-10-CM

## 2020-06-24 DIAGNOSIS — I25.810 CORONARY ARTERY DISEASE INVOLVING CORONARY BYPASS GRAFT OF NATIVE HEART WITHOUT ANGINA PECTORIS: Primary | ICD-10-CM

## 2020-06-24 DIAGNOSIS — I48.20 CHRONIC ATRIAL FIBRILLATION (HCC): ICD-10-CM

## 2020-06-24 PROCEDURE — 93000 ELECTROCARDIOGRAM COMPLETE: CPT | Performed by: NURSE PRACTITIONER

## 2020-06-24 PROCEDURE — 99214 OFFICE O/P EST MOD 30 MIN: CPT | Performed by: NURSE PRACTITIONER

## 2020-06-24 RX ORDER — FUROSEMIDE 40 MG/1
60 TABLET ORAL DAILY
Start: 2020-06-24

## 2020-06-24 RX ORDER — ATORVASTATIN CALCIUM 40 MG/1
40 TABLET, FILM COATED ORAL NIGHTLY
Qty: 90 TABLET | Refills: 3 | Status: SHIPPED | OUTPATIENT
Start: 2020-06-24

## 2020-06-24 NOTE — PROGRESS NOTES
Date of Office Visit: 20  Encounter Provider: SHARMILA Rucker  Place of Service: [facility] CARDIOLOG[facility]  Patient Name: Robby Ken  :1940    Chief Complaint   Patient presents with   • Coronary Artery Disease   • Atrial Fibrillation   • Follow-up   :     HPI: Robby Ken is a 79 y.o. male  with history of hypertension, hyperlipidemia, coronary artery disease, atrial fibrillation, transient cerebral ischemia, prostate cancer.      He is followed by Dr. Rai.  I will see him for the first time today and have reviewed his medical record.    He has history of coronary artery bypass grafting.  He had a follow-up cardiac catheterization 2013 which showed stenosis in the vein graft to the diagonal.with the ischemic areas being the diagonal vessel in the very distal circumflex.  Echocardiogram 2016 normal ventricular systolic function.  He then complained of shortness of breath lower extremity swelling in 2019.  Echocardiogram at that time showed normal left ventricular systolic function and no significant valvular disease.  Since he was taking furosemide on an as-needed basis that was increased to daily.    We visit today for annual reassessment.  He has been doing relatively well he did have a COPD exacerbation was diagnosed with Pseudomonas treated with antibiotic and steroid.  He improved.  He has not had any falls this year he continues to have his INR checked at anticoagulation clinic in [facility] through his PCP office.  He denies chest pain tightness pressure, palpitation, shortness of breath, near-syncope or syncope.  He has occasional dizziness which is not new.  He has swelling in the right lower extremity which was his harvest site.  Since taking 60 mg Lasix daily he is not had any real issues with edema.  His wife who accompanies us today reports his heart rate is normally 50-60 bpm.      Allergies   Allergen Reactions   • Morphine  "Hives       Past Medical History:   Diagnosis Date   • Atrial fibrillation (CMS/HCC)    • Back pain due to injury    • Chronic coronary artery disease    • COPD (chronic obstructive pulmonary disease) (CMS/HCC)    • Hyperlipidemia    • Hypertension    • Myocardial infarction (CMS/HCC)    • Prostate cancer (CMS/HCC)    • Transient cerebral ischemia        Past Surgical History:   Procedure Laterality Date   • CARDIAC CATHETERIZATION  08/2013   • CORONARY ARTERY BYPASS GRAFT     • LUMBAR EPIDURAL INJECTION     • PROSTATECTOMY      suprapubic   • UMBILICAL HERNIA REPAIR           Family and social history reviewed.     Review of Systems   Constitution: Positive for malaise/fatigue.   Cardiovascular: Positive for leg swelling.     All other systems were reviewed and are negative          Objective:     Vitals:    06/24/20 1044   BP: 100/62   BP Location: Right arm   Patient Position: Sitting   Pulse: 102   Weight: 122 kg (269 lb)   Height: 182.9 cm (72\")     Body mass index is 36.48 kg/m².    PHYSICAL EXAM:  Physical Exam   Constitutional: He is oriented to person, place, and time. He appears well-developed and well-nourished. No distress.   HENT:   Head: Normocephalic.   Eyes: Conjunctivae are normal.   Neck: Normal range of motion. No JVD present.   Cardiovascular: Normal rate, normal heart sounds and intact distal pulses. An irregularly irregular rhythm present.   No murmur heard.  Pulses:       Carotid pulses are 2+ on the right side, and 2+ on the left side.       Radial pulses are 2+ on the right side, and 2+ on the left side.        Posterior tibial pulses are 2+ on the right side, and 2+ on the left side.   Pulmonary/Chest: Effort normal and breath sounds normal. No respiratory distress. He has no wheezes. He has no rhonchi. He has no rales. He exhibits no tenderness.   Abdominal: Soft. Bowel sounds are normal. He exhibits no distension.   Musculoskeletal: Normal range of motion. He exhibits no edema. "   Neurological: He is alert and oriented to person, place, and time.   Skin: Skin is warm, dry and intact. No rash noted. He is not diaphoretic. No cyanosis.   Psychiatric: He has a normal mood and affect. His behavior is normal. Judgment and thought content normal.         ECG 12 Lead  Date/Time: 6/24/2020 11:07 AM  Performed by: Nadege Torres APRN  Authorized by: Nadege Torres APRN   Comparison: compared with previous ECG   Similar to previous ECG  Rhythm: atrial fibrillation  Rate: tachycardic  BPM: 102    Clinical impression: abnormal EKG            Current Outpatient Medications   Medication Sig Dispense Refill   • aspirin 81 MG tablet Take 1 tablet by mouth daily.     • atorvastatin (LIPITOR) 40 MG tablet Take 1 tablet by mouth Every Night. 90 tablet 3   • b complex vitamins capsule Take 1 capsule by mouth daily.     • carvedilol (COREG) 6.25 MG tablet Take 6.25 mg by mouth. TAKE 2 TABLETS QAM, AND 1 TABLET QPM      • folic acid (FOLVITE) 400 MCG tablet Take 400 mcg by mouth Daily.     • furosemide (LASIX) 40 MG tablet Take 1.5 tablets by mouth Daily.     • Glucosamine HCl (GLUCOSAMINE PO) Take 1 capsule by mouth daily.     • hydrALAZINE (APRESOLINE) 25 MG tablet Take 1 tablet by mouth Daily.     • Multiple Vitamin (MULTIVITAMIN) capsule Take 1 capsule by mouth daily.     • omeprazole OTC (PriLOSEC OTC) 20 MG EC tablet Take 1 tablet by mouth daily.     • potassium chloride (KLOR-CON) 10 MEQ CR tablet Take 2 tablets by mouth daily     • warfarin (COUMADIN) 5 MG tablet Take 5 mg by mouth Daily. Take one tablet by mouth daily except take 1.5 tablets on Tuesday and Friday       No current facility-administered medications for this visit.      Assessment:       Diagnosis Plan   1. Coronary artery disease involving coronary bypass graft of native heart without angina pectoris  ECG 12 Lead   2. Chronic atrial fibrillation (CMS/HCC)  ECG 12 Lead   3. Essential hypertension     4. Mixed hyperlipidemia     5. Lower  extremity edema     6. Obesity (BMI 35.0-39.9 without comorbidity)          Orders Placed This Encounter   Procedures   • ECG 12 Lead     This order was created via procedure documentation         Plan:   1.  79-year-old gentleman with history of severe coronary artery disease status post coronary artery bypass grafting.  Cardiac catheterization August 2013 showed stenosis in the vein graft to the diagonal with the ischemic areas being the diagonal vessel in the very distal circumflex.  Echocardiogram August 2016 normal ventricular systolic function  -No angina continue the same  2.  Chronic atrial fibrillation chads 2 Vasc score of 4.  Maintained on warfarin  Heart rate is a little elevated today but reportedly better at home wife is to continue following that closely let me know if there are any concerns.  3. Hyperlipidemia on atorvastatin target dose.  His wife is to request his recent labs from his PCP to be faxed to me  4. History of hypertension. His blood pressure appears to be adequately controlled.   5. History of remote transient ischemic attack as above.    6.  History of lower extremity edema and shortness of breath  7.  History of fall with residual back injury which is limit his activity-he is not had any falls this year  8.  COPD being followed by Pablo Faria MD in [place]    Follow-up in 1 year call with questions or concerns.          It has been a pleasure to participate in this patient's care.      Thank you,  SHARMILA Rucker      **I used Dragon to dictate this note:**

## 2020-06-26 NOTE — TELEPHONE ENCOUNTER
Called and spoke to Sommer with Dr. Ghosh office to request copy of most recent labs be faxed to us. / BRITTANY   
Received copied of requested lab results.  I have placed these in your inbox for review.// BRITTANY   
Reviewed to be scanned  
nonurgent:Please call PCP to obtain most recent labs  
swelling

## 2021-03-10 ENCOUNTER — OFFICE VISIT (OUTPATIENT)
Dept: CARDIOLOGY | Facility: CLINIC | Age: 81
End: 2021-03-10

## 2021-03-10 VITALS — WEIGHT: 268 LBS | HEIGHT: 72 IN | HEART RATE: 61 BPM | OXYGEN SATURATION: 96 % | BODY MASS INDEX: 36.3 KG/M2

## 2021-03-10 DIAGNOSIS — I25.810 CORONARY ARTERY DISEASE INVOLVING CORONARY BYPASS GRAFT OF NATIVE HEART WITHOUT ANGINA PECTORIS: Primary | ICD-10-CM

## 2021-03-10 DIAGNOSIS — I48.11 LONGSTANDING PERSISTENT ATRIAL FIBRILLATION (HCC): ICD-10-CM

## 2021-03-10 PROCEDURE — 99214 OFFICE O/P EST MOD 30 MIN: CPT | Performed by: INTERNAL MEDICINE

## 2021-03-10 RX ORDER — OMEPRAZOLE 20 MG/1
CAPSULE, DELAYED RELEASE ORAL
COMMUNITY
Start: 2021-01-08

## 2021-03-10 RX ORDER — IPRATROPIUM BROMIDE AND ALBUTEROL SULFATE 2.5; .5 MG/3ML; MG/3ML
SOLUTION RESPIRATORY (INHALATION)
COMMUNITY
Start: 2021-02-23

## 2021-03-10 RX ORDER — POTASSIUM CHLORIDE 750 MG/1
TABLET, FILM COATED, EXTENDED RELEASE ORAL
COMMUNITY
Start: 2021-01-22

## 2021-03-10 RX ORDER — TIOTROPIUM BROMIDE AND OLODATEROL 3.124; 2.736 UG/1; UG/1
SPRAY, METERED RESPIRATORY (INHALATION)
COMMUNITY
Start: 2021-03-02

## 2021-03-10 RX ORDER — NITROGLYCERIN 0.4 MG/1
TABLET SUBLINGUAL
Qty: 25 TABLET | Refills: 3 | Status: SHIPPED | OUTPATIENT
Start: 2021-03-10 | End: 2022-03-24 | Stop reason: SDUPTHER

## 2021-03-12 NOTE — PROGRESS NOTES
Date of Office Visit:  3/10/2021  Encounter Provider: Shiraz Bradford MD  Place of Service: [facility] CARDIOLOGY  Patient Name: Robby Ken  :1940    Chief complaint: Follow-up for coronary artery disease, persistent atrial fibrillation.    History of Present Illness:    I had the pleasure of seeing the patient in cardiology office on 3/10/2021.  He is a very pleasant 80 year-old male with a history of coronary artery disease, CVA, persistent atrial fibrillation, chronic lower extremity edema, and COPD who presents for follow-up.  The patient has formerly followed with Dr. Rai, but will be switching care to me today.    The patient has a history of coronary artery disease, and underwent a four-vessel CABG in .  At that time, he had a LIMA to LAD, SVG to diagonal, SVG to OM, and SVG to right PDA.  He evidently had 2 stents after his CABG, although details of these are not immediately available.  A left heart catheterization in 2013 showed severe disease in the SVG to diagonal and in the distal left circumflex coronary artery, although all 3 other grafts were widely patent.  Medical management was recommended at that time.  His anginal equivalent has always been substernal chest pain.    The patient also has a history of an acute CVA following his CABG in , which was likely related to atrial fibrillation.  He has since developed persistent atrial fibrillation, and has had long-term anticoagulation with warfarin.  He also has chronic lower extremity edema, mainly in the right lower extremity, which is where his veins were harvested for his CABG.  He also has significant COPD, and is followed by Dr. Faria in [place].    The patient presents today to establish care with me.  He has chronic shortness of breath from his COPD, which has not worsened.  He does wheeze intermittently, and has some dyspnea on exertion.  However, he states that this has not  changed.  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  He is in the middle of a 6-week course of Cipro currently.  He has not had any chest discomfort or angina.  His lower extremity edema has been stable.  His INR was 3.1 today, and he has not had any bleeding on the warfarin.    Past Medical History:   Diagnosis Date   • Atrial fibrillation (CMS/HCC)    • Back pain due to injury    • Chronic coronary artery disease    • COPD (chronic obstructive pulmonary disease) (CMS/HCC)    • CVA (cerebral vascular accident) (CMS/HCC)    • Hyperlipidemia    • Hypertension    • Myocardial infarction (CMS/HCC)    • Prostate cancer (CMS/HCC)    • Transient cerebral ischemia        Past Surgical History:   Procedure Laterality Date   • CARDIAC CATHETERIZATION  08/2013   • CORONARY ARTERY BYPASS GRAFT     • LUMBAR EPIDURAL INJECTION     • PROSTATECTOMY      suprapubic   • UMBILICAL HERNIA REPAIR         Current Outpatient Medications on File Prior to Visit   Medication Sig Dispense Refill   • Anoro Ellipta 62.5-25 MCG/INH aerosol powder  inhaler      • aspirin 81 MG tablet Take 1 tablet by mouth daily.     • atorvastatin (LIPITOR) 40 MG tablet Take 1 tablet by mouth Every Night. 90 tablet 3   • b complex vitamins capsule Take 1 capsule by mouth daily.     • carvedilol (COREG) 6.25 MG tablet Take 6.25 mg by mouth. TAKE 2 TABLETS QAM, AND 1 TABLET QPM      • folic acid (FOLVITE) 400 MCG tablet Take 400 mcg by mouth Daily.     • furosemide (LASIX) 40 MG tablet Take 1.5 tablets by mouth Daily.     • Glucosamine HCl (GLUCOSAMINE PO) Take 1 capsule by mouth daily.     • hydrALAZINE (APRESOLINE) 25 MG tablet Take 1 tablet by mouth Daily.     • ipratropium-albuterol (DUO-NEB) 0.5-2.5 mg/3 ml nebulizer      • Multiple Vitamin (MULTIVITAMIN) capsule Take 1 capsule by mouth daily.     • omeprazole (priLOSEC) 20 MG capsule      • omeprazole OTC (PriLOSEC OTC)  "20 MG EC tablet Take 1 tablet by mouth daily.     • potassium chloride (KLOR-CON) 10 MEQ CR tablet Take 2 tablets by mouth daily     • potassium chloride 10 MEQ CR tablet      • Stiolto Respimat 2.5-2.5 MCG/ACT aerosol solution inhaler      • warfarin (COUMADIN) 5 MG tablet Take 5 mg by mouth Daily. Take one tablet by mouth daily except take 1.5 tablets on Tuesday and Friday       No current facility-administered medications on file prior to visit.     Allergies as of 03/10/2021 - Reviewed 2020   Allergen Reaction Noted   • Morphine Hives 2016     Social History     Socioeconomic History   • Marital status:      Spouse name: Not on file   • Number of children: Not on file   • Years of education: Not on file   • Highest education level: Not on file   Tobacco Use   • Smoking status: Former Smoker     Packs/day: 2.00     Quit date:      Years since quittin.2   • Smokeless tobacco: Never Used   • Tobacco comment: CAFFEINE USE   Substance and Sexual Activity   • Alcohol use: No   • Drug use: No   • Sexual activity: Defer     Family History   Problem Relation Age of Onset   • Breast cancer Sister    • Diabetes Sister    • Colon cancer Brother    • Prostate cancer Brother    • Coronary artery disease Other    • Heart attack Father 50   • Heart disease Father    • Hypertension Brother        Review of Systems   Constitutional: Positive for malaise/fatigue.   Cardiovascular: Positive for dyspnea on exertion and leg swelling.   Respiratory: Positive for shortness of breath.    All other systems reviewed and are negative.     Objective:     Vitals:    03/10/21 1316   Pulse: 61   SpO2: 96%   Weight: 122 kg (268 lb)   Height: 182.9 cm (72\")     Body mass index is 36.35 kg/m².    Constitutional:       Appearance: Healthy appearance. Well-developed.   Eyes:      Conjunctiva/sclera: Conjunctivae normal.   HENT:      Head: Normocephalic and atraumatic.   Pulmonary:      Effort: Pulmonary effort is normal. "      Breath sounds: Normal breath sounds.   Cardiovascular:      Normal rate. Irregularly irregular rhythm.      Murmurs: There is no murmur.      No gallop.   Edema:     Comments: There is 1+ edema of the right lower extremity and trace lower edema of the left lower extremity  Abdominal:      Palpations: Abdomen is soft.      Tenderness: There is no abdominal tenderness.   Musculoskeletal:      Cervical back: Neck supple. Skin:     General: Skin is warm.   Neurological:      Mental Status: Alert and oriented to person, place, and time.   Psychiatric:         Behavior: Behavior normal.       Lab Review:   Procedures       Cardiac Procedures:  1.  Status post four-vessel CABG in 1999: LIMA to LAD, SVG to diagonal, SVG to OM, and SVG to right PDA.  2.  Left heart catheterization in 8/2013: There was severe disease in the SVG to diagonal.  All other grafts were patent.  There was also severe disease in the distal left circumflex.  3.  Echocardiogram on 6/3/2019: The ejection fraction was 53%.  The right ventricle is normal.  The aortic valve is moderately calcified.    Assessment:       Diagnosis Plan   1. Coronary artery disease involving coronary bypass graft of native heart without angina pectoris     2. Longstanding persistent atrial fibrillation (CMS/[facility])       Plan:       Overall, I feel the patient is stable.  He has persistent atrial fibrillation, which is asymptomatic at this point.  He is anticoagulated with warfarin, and he has not had any bleeding issues.  He did have an acute CVA after his CABG in 1999, which was felt to be secondary to atrial fibrillation, and anticoagulation is obviously going to be important going forward.    His coronary artery disease is stable, and he has had no angina.  He will continue on the aspirin and Lipitor.  I am going to check to see if he has had a lipid panel recently.  He will continue on the carvedilol at 12.5 mg in the morning and 6.25 mg in the evening.  His rate is  well controlled with this, and this is also for blood pressure and coronary artery disease.  His lower extremity edema is fairly stable, and he will continue on Lasix at 60 mg/day.  He is only taking hydralazine at night as his blood pressure had been labile, but has been more stable recently.  I am fine with a level 132/80 and a 80-year-old patient.  His ejection fraction has been stable, and was 53% in 2019.    I did provide a prescription for sublingual nitroglycerin.  Otherwise, I will see him back in 6 months unless other issues arise.    I spent 40 minutes in the care of the patient, including reviewing his previous cardiac records and medical records.  Greater than 50% of this time was spent face-to-face counseling with the patient and his wife regarding his coronary artery disease, atrial fibrillation, and future management.

## 2021-09-08 ENCOUNTER — OFFICE VISIT (OUTPATIENT)
Dept: CARDIOLOGY | Facility: CLINIC | Age: 81
End: 2021-09-08

## 2021-09-08 VITALS
HEIGHT: 72 IN | SYSTOLIC BLOOD PRESSURE: 100 MMHG | WEIGHT: 227 LBS | DIASTOLIC BLOOD PRESSURE: 62 MMHG | BODY MASS INDEX: 30.75 KG/M2 | OXYGEN SATURATION: 94 % | HEART RATE: 65 BPM

## 2021-09-08 DIAGNOSIS — I48.11 LONGSTANDING PERSISTENT ATRIAL FIBRILLATION (HCC): ICD-10-CM

## 2021-09-08 DIAGNOSIS — I25.810 CORONARY ARTERY DISEASE INVOLVING CORONARY BYPASS GRAFT OF NATIVE HEART WITHOUT ANGINA PECTORIS: Primary | ICD-10-CM

## 2021-09-08 PROCEDURE — 99213 OFFICE O/P EST LOW 20 MIN: CPT | Performed by: INTERNAL MEDICINE

## 2021-09-08 RX ORDER — TOBRAMYCIN INHALATION SOLUTION 300 MG/5ML
INHALANT RESPIRATORY (INHALATION)
COMMUNITY
Start: 2021-08-31

## 2021-09-08 RX ORDER — PREDNISONE 20 MG/1
20 TABLET ORAL EVERY MORNING
COMMUNITY
Start: 2021-09-03

## 2021-09-08 RX ORDER — CYCLOBENZAPRINE HCL 10 MG
10 TABLET ORAL 3 TIMES DAILY
COMMUNITY
Start: 2021-07-14

## 2021-09-19 NOTE — PROGRESS NOTES
Date of Office Visit:   2021  Encounter Provider: Shiraz Bradford MD  Place of Service: [facility] CARDIOLOGY  Patient Name: Robby Ken  :1940    Chief complaint: Follow-up for coronary artery disease, persistent atrial fibrillation.    History of Present Illness:    I had the pleasure of seeing the patient in cardiology office on 2021.  He is a very pleasant 80 year-old male with a history of coronary artery disease, CVA, persistent atrial fibrillation, chronic lower extremity edema, and COPD who presents for follow-up.  The patient has formerly followed with Dr. Rai, but switched care to me on 3/10/2021.    The patient has a history of coronary artery disease, and underwent a four-vessel CABG in .  At that time, he had a LIMA to LAD, SVG to diagonal, SVG to OM, and SVG to right PDA.  He evidently had 2 stents after his CABG, although details of these are not immediately available.  A left heart catheterization in 2013 showed severe disease in the SVG to diagonal and in the distal left circumflex coronary artery, although all 3 other grafts were widely patent.  Medical management was recommended at that time.  His anginal equivalent has always been substernal chest pain.    The patient also has a history of an acute CVA following his CABG in , which was likely related to atrial fibrillation.  He has since developed persistent atrial fibrillation, and has had long-term anticoagulation with warfarin.  He also has chronic lower extremity edema, mainly in the right lower extremity, which is where his veins were harvested for his CABG.  He also has significant COPD, and is followed by Dr. Faria in [place].    The patient presents today for follow-up.  He did have an episode of syncope on 2021.  He was felt to have a COPD exacerbation, and it was also felt that this was post tussive syncope as he had been coughing significantly prior to this.   He was in atrial fibrillation with rapid response when he presented, although this did calm down.  His rate is normal today.  He has not had further syncope or near syncope.  His shortness of breath is currently at baseline and occurs with light to moderate activity.  His systolic blood pressure has been running between 120 and 130 at home.    Past Medical History:   Diagnosis Date   • Atrial fibrillation (CMS/HCC)    • Back pain due to injury    • Chronic coronary artery disease    • COPD (chronic obstructive pulmonary disease) (CMS/HCC)    • CVA (cerebral vascular accident) (CMS/HCC)    • Hyperlipidemia    • Hypertension    • Myocardial infarction (CMS/HCC)    • Prostate cancer (CMS/HCC)    • Transient cerebral ischemia        Past Surgical History:   Procedure Laterality Date   • CARDIAC CATHETERIZATION  08/2013   • CORONARY ARTERY BYPASS GRAFT     • LUMBAR EPIDURAL INJECTION     • PROSTATECTOMY      suprapubic   • UMBILICAL HERNIA REPAIR         Current Outpatient Medications on File Prior to Visit   Medication Sig Dispense Refill   • Anoro Ellipta 62.5-25 MCG/INH aerosol powder  inhaler      • aspirin 81 MG tablet Take 1 tablet by mouth daily.     • atorvastatin (LIPITOR) 40 MG tablet Take 1 tablet by mouth Every Night. 90 tablet 3   • b complex vitamins capsule Take 1 capsule by mouth daily.     • carvedilol (COREG) 6.25 MG tablet Take 6.25 mg by mouth. TAKE 2 TABLETS QAM, AND 1 TABLET QPM      • cyclobenzaprine (FLEXERIL) 10 MG tablet Take 10 mg by mouth 3 (Three) Times a Day.     • folic acid (FOLVITE) 400 MCG tablet Take 400 mcg by mouth Daily.     • furosemide (LASIX) 40 MG tablet Take 1.5 tablets by mouth Daily.     • Glucosamine HCl (GLUCOSAMINE PO) Take 1 capsule by mouth daily.     • hydrALAZINE (APRESOLINE) 25 MG tablet Take 1 tablet by mouth Daily.     • ipratropium-albuterol (DUO-NEB) 0.5-2.5 mg/3 ml nebulizer      • Multiple Vitamin (MULTIVITAMIN) capsule Take 1 capsule by mouth daily.     •  nitroglycerin (NITROSTAT) 0.4 MG SL tablet 1 under the tongue as needed for angina, may repeat q5mins for up three doses 25 tablet 3   • omeprazole (priLOSEC) 20 MG capsule      • omeprazole OTC (PriLOSEC OTC) 20 MG EC tablet Take 1 tablet by mouth daily.     • potassium chloride (KLOR-CON) 10 MEQ CR tablet Take 2 tablets by mouth daily     • potassium chloride 10 MEQ CR tablet      • predniSONE (DELTASONE) 20 MG tablet Take 20 mg by mouth Every Morning.     • Stiolto Respimat 2.5-2.5 MCG/ACT aerosol solution inhaler      • tobramycin PF (LORETO) 300 MG/5ML nebulizer solution      • warfarin (COUMADIN) 5 MG tablet Take 5 mg by mouth Daily. Take one tablet by mouth daily except take 1.5 tablets on Tuesday and Friday       No current facility-administered medications on file prior to visit.     Allergies as of 2021 - Reviewed 2021   Allergen Reaction Noted   • Morphine Hives 2016     Social History     Socioeconomic History   • Marital status:      Spouse name: Not on file   • Number of children: Not on file   • Years of education: Not on file   • Highest education level: Not on file   Tobacco Use   • Smoking status: Former Smoker     Packs/day: 2.00     Quit date:      Years since quittin.7   • Smokeless tobacco: Never Used   • Tobacco comment: CAFFEINE USE   Substance and Sexual Activity   • Alcohol use: No   • Drug use: No   • Sexual activity: Defer     Family History   Problem Relation Age of Onset   • Breast cancer Sister    • Diabetes Sister    • Colon cancer Brother    • Prostate cancer Brother    • Coronary artery disease Other    • Heart attack Father 50   • Heart disease Father    • Hypertension Brother        Review of Systems   Constitutional: Positive for malaise/fatigue.   Cardiovascular: Positive for dyspnea on exertion and leg swelling.   Respiratory: Positive for shortness of breath.    All other systems reviewed and are negative.     Objective:     Vitals:    21  "1033   BP: 100/62   Pulse: 65   SpO2: 94%   Weight: 103 kg (227 lb)   Height: 182.9 cm (72.01\")     Body mass index is 30.78 kg/m².    Constitutional:       Appearance: Healthy appearance. Well-developed.   Eyes:      Conjunctiva/sclera: Conjunctivae normal.   HENT:      Head: Normocephalic and atraumatic.   Pulmonary:      Effort: Pulmonary effort is normal.      Breath sounds: Normal breath sounds.   Cardiovascular:      Normal rate. Irregularly irregular rhythm.      Murmurs: There is no murmur.      No gallop.   Edema:     Comments: There is 1+ edema of the right lower extremity and trace lower edema of the left lower extremity  Abdominal:      Palpations: Abdomen is soft.      Tenderness: There is no abdominal tenderness.   Musculoskeletal:      Cervical back: Neck supple. Skin:     General: Skin is warm.   Neurological:      Mental Status: Alert and oriented to person, place, and time.   Psychiatric:         Behavior: Behavior normal.       Lab Review:   Procedures       Cardiac Procedures:  1.  Status post four-vessel CABG in 1999: LIMA to LAD, SVG to diagonal, SVG to OM, and SVG to right PDA.  2.  Left heart catheterization in 8/2013: There was severe disease in the SVG to diagonal.  All other grafts were patent.  There was also severe disease in the distal left circumflex.  3.  Echocardiogram on 6/3/2019: The ejection fraction was 53%.  The right ventricle is normal.  The aortic valve is moderately calcified.  4.  Carotid Doppler ultrasound on 7/30/2021: There was 1-15% disease bilaterally.  5.  Echocardiogram on 7/30/2021: The ejection fraction was 55 to 60%.  There was paradoxical septal motion.  There was mild LVH.  There was mild mitral regurgitation.  There was mild tricuspid regurgitation.  The agitated saline study was negative.    Assessment:       Diagnosis Plan   1. Coronary artery disease involving coronary bypass graft of native heart without angina pectoris     2. Longstanding persistent atrial " fibrillation (CMS/[facility])       Plan:       Again, the patient did have an episode of syncope on 7/29/2021.  However, he had been coughing significantly, and it ultimately was felt that this was from posttussive syncope.  He did have an echocardiogram and carotid Doppler ultrasound performed on 7/30/2021, both of which showed no significant issues.    His atrial fibrillation is controlled.  He is anticoagulated with warfarin, and he has not had any bleeding issues from this.  He did have an acute CVA after his CABG in 1999, which was felt to be secondary to atrial fibrillation, and anticoagulation is obviously going to be very important for the rest of his life if possible.    His coronary artery disease is stable, and he has had no angina.  He will continue on the aspirin and Lipitor.  He will continue on the carvedilol at 12.5 mg in the morning and 6.25 mg in the evening.  His rate is well controlled with this, and this is also for blood pressure and coronary artery disease.  His lower extremity edema is fairly stable, and he will continue on Lasix at 60 mg/day.  He is only taking hydralazine at night as his blood pressure had been labile in the past.  His systolic blood pressure at home has ranged between 120 and 130.  He does have sublingual nitroglycerin at home if needed.    For now, I did not change any medications.  I will plan on seeing him back in the office in 6 months unless other issues arise.

## 2022-03-23 ENCOUNTER — OFFICE VISIT (OUTPATIENT)
Dept: CARDIOLOGY | Facility: CLINIC | Age: 82
End: 2022-03-23

## 2022-03-23 VITALS
WEIGHT: 207 LBS | DIASTOLIC BLOOD PRESSURE: 62 MMHG | HEART RATE: 64 BPM | SYSTOLIC BLOOD PRESSURE: 106 MMHG | BODY MASS INDEX: 28.04 KG/M2 | HEIGHT: 72 IN | OXYGEN SATURATION: 95 %

## 2022-03-23 DIAGNOSIS — I48.11 LONGSTANDING PERSISTENT ATRIAL FIBRILLATION: ICD-10-CM

## 2022-03-23 DIAGNOSIS — I25.810 CORONARY ARTERY DISEASE INVOLVING CORONARY BYPASS GRAFT OF NATIVE HEART WITHOUT ANGINA PECTORIS: Primary | ICD-10-CM

## 2022-03-23 PROCEDURE — 99213 OFFICE O/P EST LOW 20 MIN: CPT | Performed by: INTERNAL MEDICINE

## 2022-03-24 RX ORDER — NITROGLYCERIN 0.4 MG/1
TABLET SUBLINGUAL
Qty: 25 TABLET | Refills: 3 | Status: SHIPPED | OUTPATIENT
Start: 2022-03-24

## 2022-03-25 NOTE — PROGRESS NOTES
Date of Office Visit:   3/23/2022  Encounter Provider: Shiraz Bradford MD  Place of Service: [facility] CARDIOLOGY  Patient Name: Robby Ken  :1940    Chief complaint: Follow-up for coronary artery disease, persistent atrial fibrillation.    History of Present Illness:    I had the pleasure of seeing the patient in cardiology office on 3/23/2022.  He is a very pleasant 81 year-old male with a history of coronary artery disease, CVA, persistent atrial fibrillation, chronic lower extremity edema, and COPD who presents for follow-up.  The patient has formerly followed with Dr. Rai, but switched care to me on 3/10/2021.    The patient has a history of coronary artery disease, and underwent a four-vessel CABG in .  At that time, he had a LIMA to LAD, SVG to diagonal, SVG to OM, and SVG to right PDA.  He evidently had 2 stents after his CABG, although details of these are not immediately available.  A left heart catheterization in 2013 showed severe disease in the SVG to diagonal and in the distal left circumflex coronary artery, although all 3 other grafts were widely patent.  Medical management was recommended at that time.  His anginal equivalent has always been substernal chest pain.    The patient also has a history of an acute CVA following his CABG in , which was likely related to atrial fibrillation.  He has since developed persistent atrial fibrillation, and has had long-term anticoagulation with warfarin.  He also has chronic lower extremity edema, mainly in the right lower extremity, which is where his veins were harvested for his CABG.  He also has significant COPD, and is followed by Dr. Faria in [place].    The patient presents today for follow-up.  Unfortunately, he has had multiple issues since he last saw me.  He fell and broke his right hip in 2022, and required operative repair.  He then developed a GI bleed and was found to have  2 bleeding gastric ulcers, which both were clipped.  He is now off of aspirin, although he is back on warfarin.  He has not had any recurrent bleeding since the clips were placed.  His edema is at baseline.  He has lost about 20 pounds since his last visit.  His shortness of breath is at baseline, he has not had chest pain.    Past Medical History:   Diagnosis Date   • Atrial fibrillation (HCC)    • Back pain due to injury    • Chronic coronary artery disease    • COPD (chronic obstructive pulmonary disease) (HCC)    • CVA (cerebral vascular accident) (HCC)    • Hyperlipidemia    • Hypertension    • Myocardial infarction (HCC)    • Prostate cancer (HCC)    • Transient cerebral ischemia        Past Surgical History:   Procedure Laterality Date   • CARDIAC CATHETERIZATION  08/2013   • CORONARY ARTERY BYPASS GRAFT     • LUMBAR EPIDURAL INJECTION     • PROSTATECTOMY      suprapubic   • UMBILICAL HERNIA REPAIR         Current Outpatient Medications on File Prior to Visit   Medication Sig Dispense Refill   • Anoro Ellipta 62.5-25 MCG/INH aerosol powder  inhaler      • atorvastatin (LIPITOR) 40 MG tablet Take 1 tablet by mouth Every Night. 90 tablet 3   • b complex vitamins capsule Take 1 capsule by mouth daily.     • carvedilol (COREG) 6.25 MG tablet Take 6.25 mg by mouth. TAKE 2 TABLETS QAM, AND 1 TABLET QPM      • folic acid (FOLVITE) 400 MCG tablet Take 400 mcg by mouth Daily.     • furosemide (LASIX) 40 MG tablet Take 1.5 tablets by mouth Daily.     • Glucosamine HCl (GLUCOSAMINE PO) Take 1 capsule by mouth daily.     • hydrALAZINE (APRESOLINE) 25 MG tablet Take 1 tablet by mouth Daily.     • ipratropium-albuterol (DUO-NEB) 0.5-2.5 mg/3 ml nebulizer      • Multiple Vitamin (MULTIVITAMIN) capsule Take 1 capsule by mouth daily.     • omeprazole (priLOSEC) 20 MG capsule      • potassium chloride (K-DUR,KLOR-CON) 10 MEQ CR tablet Take 2 tablets by mouth daily     • Stiolto Respimat 2.5-2.5 MCG/ACT aerosol solution inhaler       • tobramycin PF (LORETO) 300 MG/5ML nebulizer solution      • warfarin (COUMADIN) 5 MG tablet Take 5 mg by mouth Daily. Take one tablet by mouth daily except take 1.5 tablets on Tuesday and Friday     • [DISCONTINUED] nitroglycerin (NITROSTAT) 0.4 MG SL tablet 1 under the tongue as needed for angina, may repeat q5mins for up three doses 25 tablet 3   • aspirin 81 MG tablet Take 1 tablet by mouth daily.     • cyclobenzaprine (FLEXERIL) 10 MG tablet Take 10 mg by mouth 3 (Three) Times a Day.     • omeprazole OTC (PriLOSEC OTC) 20 MG EC tablet Take 1 tablet by mouth daily.     • potassium chloride 10 MEQ CR tablet      • predniSONE (DELTASONE) 20 MG tablet Take 20 mg by mouth Every Morning.       No current facility-administered medications on file prior to visit.     Allergies as of 2022 - Reviewed 2022   Allergen Reaction Noted   • Morphine Hives 2016     Social History     Socioeconomic History   • Marital status:    Tobacco Use   • Smoking status: Former Smoker     Packs/day: 2.00     Quit date:      Years since quittin.2   • Smokeless tobacco: Never Used   • Tobacco comment: CAFFEINE USE   Substance and Sexual Activity   • Alcohol use: No   • Drug use: No   • Sexual activity: Defer     Family History   Problem Relation Age of Onset   • Breast cancer Sister    • Diabetes Sister    • Colon cancer Brother    • Prostate cancer Brother    • Coronary artery disease Other    • Heart attack Father 50   • Heart disease Father    • Hypertension Brother        Review of Systems   Constitutional: Positive for malaise/fatigue and weight loss.   Cardiovascular: Positive for dyspnea on exertion and leg swelling.   Respiratory: Positive for shortness of breath.    Musculoskeletal: Positive for joint pain.   Gastrointestinal: Positive for melena.   All other systems reviewed and are negative.     Objective:     Vitals:    22 1251   BP: 106/62   BP Location: Left arm   Patient Position:  "Sitting   Cuff Size: Large Adult   Pulse: 64   SpO2: 95%   Weight: 93.9 kg (207 lb)   Height: 182.9 cm (72.01\")     Body mass index is 28.07 kg/m².    Constitutional:       Appearance: Healthy appearance. Well-developed.   Eyes:      Conjunctiva/sclera: Conjunctivae normal.   HENT:      Head: Normocephalic and atraumatic.   Pulmonary:      Effort: Pulmonary effort is normal.      Breath sounds: Normal breath sounds.   Cardiovascular:      Normal rate. Irregularly irregular rhythm.      Murmurs: There is no murmur.      No gallop.   Edema:     Comments: There is 1-2+ edema of the right lower extremity and 1+lower edema of the left lower extremity  Abdominal:      Palpations: Abdomen is soft.      Tenderness: There is no abdominal tenderness.   Musculoskeletal:      Cervical back: Neck supple. Skin:     General: Skin is warm.   Neurological:      Mental Status: Alert and oriented to person, place, and time.   Psychiatric:         Behavior: Behavior normal.       Lab Review:   Procedures       Cardiac Procedures:  1.  Status post four-vessel CABG in 1999: LIMA to LAD, SVG to diagonal, SVG to OM, and SVG to right PDA.  2.  Left heart catheterization in 8/2013: There was severe disease in the SVG to diagonal.  All other grafts were patent.  There was also severe disease in the distal left circumflex.  3.  Echocardiogram on 6/3/2019: The ejection fraction was 53%.  The right ventricle is normal.  The aortic valve is moderately calcified.  4.  Carotid Doppler ultrasound on 7/30/2021: There was 1-15% disease bilaterally.  5.  Echocardiogram on 7/30/2021: The ejection fraction was 55 to 60%.  There was paradoxical septal motion.  There was mild LVH.  There was mild mitral regurgitation.  There was mild tricuspid regurgitation.  The agitated saline study was negative.    Assessment:       Diagnosis Plan   1. Coronary artery disease involving coronary bypass graft of native heart without angina pectoris     2. Longstanding " persistent atrial fibrillation (HCC)       Plan:       Again, he fell and broke his right hip in January 2022, and this required operative repair.  He also had a GI bleed shortly afterwards, and required clipping of 2 gastric ulcers.  He is now off of aspirin because of this.  However, he is still on warfarin.    His atrial fibrillation is controlled.  He will continue on the warfarin..  He did have an acute CVA after his CABG in 1999, which was felt to be secondary to atrial fibrillation, and anticoagulation is obviously going to be very important for the rest of his life if possible.    His coronary artery disease is stable, and he has had no angina.  He will continue on the Lipitor.  Again, the aspirin was discontinued after the recent GI bleed from gastric ulcers.  He will continue on the carvedilol at 12.5 mg in the morning and 6.25 mg in the evening.  His rate is well controlled with this, and this is also for blood pressure and coronary artery disease.  His lower extremity edema is fairly stable, and he will continue on Lasix at 60 mg/day.  I feel that most of the edema is from chronic venous stasis changes.  He is only taking hydralazine at night as his blood pressure had been labile in the past.  He does have sublingual nitroglycerin at home if needed.    For now, I did not change any medications.  I will plan on seeing him back in the office in 6 months unless other issues arise.